# Patient Record
Sex: FEMALE | Race: WHITE
[De-identification: names, ages, dates, MRNs, and addresses within clinical notes are randomized per-mention and may not be internally consistent; named-entity substitution may affect disease eponyms.]

---

## 2019-10-26 ENCOUNTER — HOSPITAL ENCOUNTER (EMERGENCY)
Dept: HOSPITAL 97 - ER | Age: 24
Discharge: HOME | End: 2019-10-26
Payer: COMMERCIAL

## 2019-10-26 VITALS — SYSTOLIC BLOOD PRESSURE: 128 MMHG | DIASTOLIC BLOOD PRESSURE: 74 MMHG

## 2019-10-26 VITALS — TEMPERATURE: 98.2 F

## 2019-10-26 VITALS — OXYGEN SATURATION: 99 %

## 2019-10-26 DIAGNOSIS — R07.89: Primary | ICD-10-CM

## 2019-10-26 DIAGNOSIS — F17.210: ICD-10-CM

## 2019-10-26 LAB
ALBUMIN SERPL BCP-MCNC: 3.4 G/DL (ref 3.4–5)
ALP SERPL-CCNC: 59 U/L (ref 45–117)
ALT SERPL W P-5'-P-CCNC: 27 U/L (ref 12–78)
AST SERPL W P-5'-P-CCNC: 24 U/L (ref 15–37)
BUN BLD-MCNC: 13 MG/DL (ref 7–18)
GLUCOSE SERPLBLD-MCNC: 75 MG/DL (ref 74–106)
HCT VFR BLD CALC: 43.1 % (ref 36–45)
INR BLD: 0.96
LIPASE SERPL-CCNC: 97 U/L (ref 73–393)
LYMPHOCYTES # SPEC AUTO: 4 K/UL (ref 0.7–4.9)
MAGNESIUM SERPL-MCNC: 2 MG/DL (ref 1.8–2.4)
METHAMPHET UR QL SCN: NEGATIVE
NT-PROBNP SERPL-MCNC: 30 PG/ML (ref ?–125)
PMV BLD: 8.1 FL (ref 7.6–11.3)
POTASSIUM SERPL-SCNC: 3.9 MMOL/L (ref 3.5–5.1)
RBC # BLD: 4.97 M/UL (ref 3.86–4.86)
THC SERPL-MCNC: NEGATIVE NG/ML
TROPONIN (EMERG DEPT USE ONLY): < 0.02 NG/ML (ref 0–0.04)

## 2019-10-26 PROCEDURE — 71045 X-RAY EXAM CHEST 1 VIEW: CPT

## 2019-10-26 PROCEDURE — 85379 FIBRIN DEGRADATION QUANT: CPT

## 2019-10-26 PROCEDURE — 83735 ASSAY OF MAGNESIUM: CPT

## 2019-10-26 PROCEDURE — 84484 ASSAY OF TROPONIN QUANT: CPT

## 2019-10-26 PROCEDURE — 36415 COLL VENOUS BLD VENIPUNCTURE: CPT

## 2019-10-26 PROCEDURE — 96361 HYDRATE IV INFUSION ADD-ON: CPT

## 2019-10-26 PROCEDURE — 83880 ASSAY OF NATRIURETIC PEPTIDE: CPT

## 2019-10-26 PROCEDURE — 99285 EMERGENCY DEPT VISIT HI MDM: CPT

## 2019-10-26 PROCEDURE — 83690 ASSAY OF LIPASE: CPT

## 2019-10-26 PROCEDURE — 80048 BASIC METABOLIC PNL TOTAL CA: CPT

## 2019-10-26 PROCEDURE — 80076 HEPATIC FUNCTION PANEL: CPT

## 2019-10-26 PROCEDURE — 85610 PROTHROMBIN TIME: CPT

## 2019-10-26 PROCEDURE — 93005 ELECTROCARDIOGRAM TRACING: CPT

## 2019-10-26 PROCEDURE — 80307 DRUG TEST PRSMV CHEM ANLYZR: CPT

## 2019-10-26 PROCEDURE — 81025 URINE PREGNANCY TEST: CPT

## 2019-10-26 PROCEDURE — 96360 HYDRATION IV INFUSION INIT: CPT

## 2019-10-26 PROCEDURE — 81003 URINALYSIS AUTO W/O SCOPE: CPT

## 2019-10-26 PROCEDURE — 85025 COMPLETE CBC W/AUTO DIFF WBC: CPT

## 2019-10-26 NOTE — EDPHYS
Physician Documentation                                                                           

 [facility]                                                                 

Name: Dea Carlisle                                                                             

Age: 24 yrs                                                                                       

Sex: Female                                                                                       

: 1995                                                                                   

MRN: [id number]                                                                                   

Arrival Date: 10/26/2019                                                                          

Time: 19:42                                                                                       

Account#: [id number]                                                                            

Bed 18                                                                                            

Private MD:                                                                                       

ED Physician Harsha Vidal                                                                      

HPI:                                                                                              

10/26                                                                                             

20:04 This 24 yrs old  Female presents to ER via Ambulatory with complaints of Chest vi 

      Pain, Arm Pain.                                                                             

20:04 The patient or guardian reports chest pain that is located primarily in the anterior    vi 

      chest wall, left. The pain radiates to Associated signs and symptoms: The patient has       

      no apparent associated signs or symptoms. The chest pain is described as burning.           

      Duration: The patient or guardian reports a single episode, that is still ongoing.          

      Modifying factors: The symptoms are alleviated by nothing. the symptoms are aggravated      

      by nothing. Severity of pain: At its worst the pain was mild in the emergency               

      department the pain is unchanged. The patient has experienced similar episodes in the       

      past, several times.                                                                        

                                                                                                  

OB/GYN:                                                                                           

19:57 LMP 10/12/2019                                                                          bb  

                                                                                                  

Historical:                                                                                       

- Allergies:                                                                                      

19:57 No Known Allergies;                                                                     bb  

- Home Meds:                                                                                      

19:57 None [Active];                                                                          bb  

- PMHx:                                                                                           

19:57 ADD/ADHD;                                                                               bb  

- PSHx:                                                                                           

19:57 None;                                                                                   bb  

                                                                                                  

- Immunization history:: Adult Immunizations up to date.                                          

- Social history:: Smoking status: Patient uses tobacco products, smokes one-half pack            

  cigarettes per day, Patient uses alcohol, but reports only rare drinking.                       

  Patient/guardian denies using street drugs.                                                     

- Ebola Screening: : No symptoms or risks identified at this time.                                

- Family history:: not pertinent.                                                                 

                                                                                                  

                                                                                                  

ROS:                                                                                              

20:04 Constitutional: Negative for fever, chills, and weight loss, Eyes: Negative for injury, vi 

      pain, redness, and discharge, ENT: Negative for injury, pain, and discharge, Neck:          

      Negative for injury, pain, and swelling, Respiratory: Negative for shortness of breath,     

      cough, wheezing, and pleuritic chest pain, Abdomen/GI: Negative for abdominal pain,         

      nausea, vomiting, diarrhea, and constipation, Back: Negative for injury and pain, :       

      Negative for injury, bleeding, discharge, and swelling, MS/Extremity: Negative for          

      injury and deformity, Skin: Negative for injury, rash, and discoloration, Neuro:            

      Negative for headache, weakness, numbness, tingling, and seizure, Psych: Negative for       

      depression, anxiety, suicide ideation, homicidal ideation, and hallucinations,              

      Allergy/Immunology: Negative for hives, rash, and allergies, Endocrine: Negative for        

      neck swelling, polydipsia, polyuria, polyphagia, and marked weight changes,                 

      Hematologic/Lymphatic: Negative for swollen nodes, abnormal bleeding, and unusual           

      bruising.                                                                                   

20:04 Cardiovascular: Positive for chest pain, of the left clavicle and anterior aspect of        

      left upper chest.                                                                           

                                                                                                  

Exam:                                                                                             

20:04 Constitutional:  This is a well developed, well nourished patient who is awake, alert,  vi 

      and in no acute distress. Head/Face:  Normocephalic, atraumatic. Eyes:  Pupils equal        

      round and reactive to light, extra-ocular motions intact.  Lids and lashes normal.          

      Conjunctiva and sclera are non-icteric and not injected.  Cornea within normal limits.      

      Periorbital areas with no swelling, redness, or edema. ENT:  Nares patent. No nasal         

      discharge, no septal abnormalities noted.  Tympanic membranes are normal and external       

      auditory canals are clear.  Oropharynx with no redness, swelling, or masses, exudates,      

      or evidence of obstruction, uvula midline.  Mucous membranes moist. Neck:  Trachea          

      midline, no thyromegaly or masses palpated, and no cervical lymphadenopathy.  Supple,       

      full range of motion without nuchal rigidity, or vertebral point tenderness.  No            

      Meningismus. Chest/axilla:  Normal chest wall appearance and motion.  Nontender with no     

      deformity.  No lesions are appreciated. Cardiovascular:  Regular rate and rhythm with a     

      normal S1 and S2.  No gallops, murmurs, or rubs.  Normal PMI, no JVD.  No pulse             

      deficits. Respiratory:  Lungs have equal breath sounds bilaterally, clear to                

      auscultation and percussion.  No rales, rhonchi or wheezes noted.  No increased work of     

      breathing, no retractions or nasal flaring. Abdomen/GI:  Soft, non-tender, with normal      

      bowel sounds.  No distension or tympany.  No guarding or rebound.  No evidence of           

      tenderness throughout. Back:  No spinal tenderness.  No costovertebral tenderness.          

      Full range of motion. Skin:  Warm, dry with normal turgor.  Normal color with no            

      rashes, no lesions, and no evidence of cellulitis. MS/ Extremity:  Pulses equal, no         

      cyanosis.  Neurovascular intact.  Full, normal range of motion. Neuro:  Awake and           

      alert, GCS 15, oriented to person, place, time, and situation.  Cranial nerves II-XII       

      grossly intact.  Motor strength 5/5 in all extremities.  Sensory grossly intact.            

      Cerebellar exam normal.  Normal gait. Psych:  Awake, alert, with orientation to person,     

      place and time.  Behavior, mood, and affect are within normal limits.                       

20:04 Musculoskeletal/extremity: Extremities: all appear grossly normal, with no appreciated  vi 

      pain with palpation, ROM: no acute changes, intact in all extremities, full active          

      range of motion, full passive range of motion, Circulation is intact in all                 

      extremities. Sensation intact. Compartment Syndrome exam of affected extremity: is          

      normal. DVT Exam: No signs of deep vein thrombosis. no pain, no swelling, no                

      tenderness, negative Homans' sign noted on exam, no appreciated bluish discoloration,       

      no erythema, no increased warmth.                                                           

                                                                                                  

Vital Signs:                                                                                      

19:57  / 81; Pulse 100; Resp 16 S; Temp 98.2(O); Pulse Ox 100% on R/A; Weight 104.33 kg bb  

      (R); Height 5 ft. 4 in. (162.56 cm) (R); Pain 4/10;                                         

20:30  / 81; Pulse 79; Resp 18 S; Pulse Ox 100% on R/A;                                 cc3 

21:10  / 60; Pulse 78; Resp 20 S; Pulse Ox 99% on R/A;                                  cc3 

21:45  / 73 RA;                                                                         ds4 

21:53  / 72 LA;                                                                         ds4 

22:15  / 74; Pulse 76; Resp 17 S; Pulse Ox 99% on R/A;                                  cc3 

22:50  / 69; Pulse 75; Resp 19 S; Pulse Ox 99% on R/A; Pain 0/10;                       cc3 

19:57 Body Mass Index 39.48 (104.33 kg, 162.56 cm)                                            bb  

                                                                                                  

MDM:                                                                                              

19:55 Patient medically screened.                                                             [facility] 

20:07 Data reviewed: vital signs, nurses notes, lab test result(s), EKG, radiologic studies,  vi 

      ultrasound.                                                                                 

                                                                                                  

10/26                                                                                             

20:03 Order name: Basic Metabolic Panel; Complete Time: 21:13                                 [facility] 

10/26                                                                                             

20:03 Order name: CBC with Diff; Complete Time: 20:53                                         [facility] 

10/26                                                                                             

20:03 Order name: LFT's; Complete Time: 21:13                                                 [facility] 

10/26                                                                                             

20:03 Order name: Magnesium; Complete Time: 21:13                                             [facility] 

10/26                                                                                             

20:03 Order name: NT PRO-BNP; Complete Time: 21:13                                            [facility] 

10/26                                                                                             

20:03 Order name: PT-INR; Complete Time: 20:49                                                [facility] 

10/26                                                                                             

20:03 Order name: Troponin (emerg Dept Use Only); Complete Time: 21:13                        [facility] 

10/26                                                                                             

20:03 Order name: Lipase; Complete Time: 21:13                                                [facility] 

10/26                                                                                             

20:03 Order name: D-Dimer; Complete Time: 20:49                                               [facility] 

10/26                                                                                             

20:53 Order name: UDS                                                                         [facility] 

10/26                                                                                             

21:14 Order name: Troponin (emerg Dept Use Only)                                              [facility] 

10/26                                                                                             

21:14 Order name: Troponin (Emerg Dept Use Only); Complete Time: 22:21                        [facility]

10/26                                                                                             

22:43 Order name: Urine Dipstick--Ancillary (enter results)                                   [facility] 

10/26                                                                                             

22:43 Order name: Urine Pregnancy--Ancillary (enter results)                                  [facility] 

10/26                                                                                             

20:03 Order name: XRAY Chest (1 view); Complete Time: 21:13                                   [facility] 

10/26                                                                                             

20:03 Order name: EKG; Complete Time: 20:04                                                   [facility] 

10/26                                                                                             

20:03 Order name: Cardiac monitoring; Complete Time: 20:04                                    [facility] 

10/26                                                                                             

20:03 Order name: EKG - Nurse/Tech; Complete Time: 20:04                                      [facility] 

10/26                                                                                             

20:03 Order name: IV Saline Lock; Complete Time: 20:25                                        [facility] 

10/26                                                                                             

20:03 Order name: Labs collected and sent; Complete Time: 20:25                               [facility] 

10/26                                                                                             

20:03 Order name: O2 Per Protocol; Complete Time: 20:04                                       [facility] 

10/26                                                                                             

20:03 Order name: O2 Sat Monitoring; Complete Time: 20:04                                     [facility] 

10/26                                                                                             

20:03 Order name: Urine Dipstick-Ancillary (obtain specimen); Complete Time: 22:39            [facility] 

10/26                                                                                             

20:03 Order name: Urine Pregnancy Test (obtain specimen); Complete Time: 22:39                [facility] 

10/26                                                                                             

21:14 Order name: Bilateral blood pressure; Complete Time: 21:45                              [facility] 

                                                                                                  

Administered Medications:                                                                         

20:13 Drug: Aspirin 162 mg Route: PO;                                                         ca1 

20:25 Follow up: Response: No adverse reaction                                                ca1 

20:30 Drug: NS 0.9% 1000 ml Route: IV; Rate: 125 ml/hr; Site: left antecubital;               cc3 

23:00 Follow up: Response: No adverse reaction; IV Status: Order to discontinue infusion; IV  cc3 

      Intake: 250ml ; patient discharged home                                                     

                                                                                                  

                                                                                                  

Disposition:                                                                                      

10/26/19 21:14 Discharged to Home. Impression: Other chest pain.                                  

- Condition is Stable.                                                                            

- Discharge Instructions: Nonspecific Chest Pain, Chest Wall Pain, Nonspecific Chest              

  Pain, Easy-to-Read, Aspirin and Your Heart.                                                     

                                                                                                  

- Medication Reconciliation Form, Thank You Letter, Antibiotic Education, Prescription            

  Opioid Use form.                                                                                

- Follow up: Private Physician; When: 2 - 3 days; Reason: Recheck today's complaints,             

  Continuance of care, Re-evaluation by your physician. Follow up: Anthony Hagan;                 

  Reason: Recheck today's complaints, Re-evaluation by your physician.                            

- Problem is new.                                                                                 

- Symptoms have improved.                                                                         

                                                                                                  

                                                                                                  

                                                                                                  

Signatures:                                                                                       

Dispatcher [employer]                           EDMS                                                 

Harsha Vidal MD MD cha Ballard, Brenda, RN                     RN                                                      

Deisi Robert                             3                                                  

Ac, JESS Arteaga                        RN   ca1                                                  

                                                                                                  

Corrections: (The following items were deleted from the chart)                                    

23:10 21:14 10/26/2019 21:14 Discharged to Home. Impression: Other chest pain. Condition is   cc3 

      Stable. Discharge Instructions: Nonspecific Chest Pain, Chest Wall Pain, Nonspecific        

      Chest Pain, Easy-to-Read, Aspirin and Your Heart. Forms are Medication Reconciliation       

      Form, Thank You Letter, Antibiotic Education, Prescription Opioid Use. Follow up:           

      Private Physician; When: 2 - 3 days; Reason: Recheck today's complaints, Continuance of     

      care, Re-evaluation by your physician. Follow up: Anthony Hagan; Reason: Recheck            

      today's complaints, Re-evaluation by your physician. Problem is new. Symptoms have          

      improved. vi                                                                               

                                                                                                  

**************************************************************************************************

## 2019-10-26 NOTE — RAD REPORT
EXAM DESCRIPTION:  Fady Single View10/26/2019 8:46 pm

 

CLINICAL HISTORY:  Chest pain

 

COMPARISON:  none

 

FINDINGS:   The lungs appear clear of acute infiltrate. The heart is normal size

 

IMPRESSION:   No acute abnormalities displayed

## 2019-10-27 NOTE — EKG
Test Date:    2019-10-26               Test Time:    19:46:03

Technician:   TORY                                    

                                                     

MEASUREMENT RESULTS:                                       

Intervals:                                           

Rate:         94                                     

MN:           148                                    

QRSD:         94                                     

QT:           380                                    

QTc:          475                                    

Axis:                                                

P:            50                                     

MN:           148                                    

QRS:          55                                     

T:            66                                     

                                                     

INTERPRETIVE STATEMENTS:                                       

                                                     

Normal sinus rhythm

Normal ECG

No previous ECG available for comparison



Electronically Signed On 10-27-19 19:52:51 CDT by Anthony Hagan

## 2021-12-07 NOTE — ER
Check labs   Nurse's Notes                                                                                     

 [facility]                                                                 

Name: Dea Carlisle                                                                             

Age: 24 yrs                                                                                       

Sex: Female                                                                                       

: 1995                                                                                   

MRN: [id number]                                                                                   

Arrival Date: 10/26/2019                                                                          

Time: 19:42                                                                                       

Account#: [id number]                                                                            

Bed 18                                                                                            

Private MD:                                                                                       

Diagnosis: Other chest pain                                                                       

                                                                                                  

Presentation:                                                                                     

10/26                                                                                             

19:53 Presenting complaint: Patient states: she is having numbness and tingling in her left   bb  

      arm x 1 week and yesterday she started having intermittent chest pain and was nauseous      

      but now the pain is 4/10 and she is not nauseous. Transition of care: patient was not       

      received from another setting of care. Onset of symptoms was 2019. Risk         

      Assessment: Do you want to hurt yourself or someone else? Patient reports no desire to      

      harm self or others. Initial Sepsis Screen: Does the patient meet any 2 criteria? No.       

      Patient's initial sepsis screen is negative. Does the patient have a suspected source       

      of infection? No. Patient's initial sepsis screen is negative. Care prior to arrival:       

      None.                                                                                       

19:53 Method Of Arrival: Ambulatory                                                             

19:53 Acuity: NIKKI 3                                                                           bb  

                                                                                                  

OB/GYN:                                                                                           

19:57 LMP 10/12/2019                                                                          bb  

                                                                                                  

Historical:                                                                                       

- Allergies:                                                                                      

19:57 No Known Allergies;                                                                     bb  

- Home Meds:                                                                                      

19:57 None [Active];                                                                          bb  

- PMHx:                                                                                           

19:57 ADD/ADHD;                                                                               bb  

- PSHx:                                                                                           

19:57 None;                                                                                   bb  

                                                                                                  

- Immunization history:: Adult Immunizations up to date.                                          

- Social history:: Smoking status: Patient uses tobacco products, smokes one-half pack            

  cigarettes per day, Patient uses alcohol, but reports only rare drinking.                       

  Patient/guardian denies using street drugs.                                                     

- Ebola Screening: : No symptoms or risks identified at this time.                                

- Family history:: not pertinent.                                                                 

                                                                                                  

                                                                                                  

Screenin:58 Abuse screen: Denies threats or abuse. Denies injuries from another. Nutritional        ca1 

      screening: No deficits noted. Tuberculosis screening: No symptoms or risk factors           

      identified. Fall Risk None identified.                                                      

                                                                                                  

Assessment:                                                                                       

19:58 General: Appears in no apparent distress. comfortable, Behavior is calm, cooperative,   ca1 

      appropriate for age. Pain: Complains of pain in chest Pain does not radiate. Pain           

      currently is 4 out of 10 on a pain scale. Pain began 1 day ago. Is intermittent. Neuro:     

      Level of Consciousness is awake, alert, obeys commands, Oriented to person, place,          

      time, situation. Neuro: Reports numbness in left arm since 3 weeks ago. Cardiovascular:     

      Heart tones S1 S2 present Capillary refill < 3 seconds Patient's skin is warm and dry.      

      Rhythm is sinus rhythm. Respiratory: Airway is patent Respiratory effort is even,           

      unlabored, Respiratory pattern is regular, symmetrical, Breath sounds are clear             

      bilaterally. GI: Abdomen is round non-distended, Bowel sounds present X 4 quads. Abd is     

      soft and non tender X 4 quads. : No deficits noted. No signs and/or symptoms were         

      reported regarding the genitourinary system. EENT: No deficits noted. No signs and/or       

      symptoms were reported regarding the EENT system. Derm: Skin is intact, is healthy with     

      good turgor, Skin is pink, warm \T\ dry. Musculoskeletal: Circulation, motion, and          

      sensation intact. Capillary refill < 3 seconds, Range of motion: intact in all              

      extremities.                                                                                

20:30 Reassessment: Patient appears in no apparent distress at this time. Patient and/or      cc3 

      family updated on plan of care and expected duration. Pain level reassessed. Patient is     

      alert, oriented x 3, equal unlabored respirations, skin warm/dry/pink.                      

21:18 Reassessment: Patient appears in no apparent distress at this time. Patient and/or      cc3 

      family updated on plan of care and expected duration. Pain level reassessed. Patient is     

      alert, oriented x 3, equal unlabored respirations, skin warm/dry/pink. Dr. Vidal         

      ordered patient for discharge home but after urine exam.                                    

22:12 Reassessment: Patient appears in no apparent distress at this time. Patient and/or      cc3 

      family updated on plan of care and expected duration. Pain level reassessed. Patient is     

      alert, oriented x 3, equal unlabored respirations, skin warm/dry/pink.                      

23:00 Reassessment: Patient appears in no apparent distress at this time. Patient and/or      cc3 

      family updated on plan of care and expected duration. Pain level reassessed. Patient is     

      alert, oriented x 3, equal unlabored respirations, skin warm/dry/pink. Patient              

      discharged home, no prescription given. IV cannula removed and patient left ER vitally      

      stable and ambulatory with her friend. No valuables left in the patient's room. Patient     

      denies pain at this time. Patient states feeling better. Patient states symptoms have       

      improved.                                                                                   

                                                                                                  

Vital Signs:                                                                                      

19:57  / 81; Pulse 100; Resp 16 S; Temp 98.2(O); Pulse Ox 100% on R/A; Weight 104.33 kg bb  

      (R); Height 5 ft. 4 in. (162.56 cm) (R); Pain 4/10;                                         

20:30  / 81; Pulse 79; Resp 18 S; Pulse Ox 100% on R/A;                                 cc3 

21:10  / 60; Pulse 78; Resp 20 S; Pulse Ox 99% on R/A;                                  cc3 

21:45  / 73 RA;                                                                         ds4 

21:53  / 72 LA;                                                                         ds4 

22:15  / 74; Pulse 76; Resp 17 S; Pulse Ox 99% on R/A;                                  cc3 

22:50  / 69; Pulse 75; Resp 19 S; Pulse Ox 99% on R/A; Pain 0/10;                       cc3 

19:57 Body Mass Index 39.48 (104.33 kg, 162.56 cm)                                            bb  

                                                                                                  

ED Course:                                                                                        

19:42 Patient arrived in ED.                                                                  es  

19:55 Harsha Vidal MD is Attending Physician.                                             vi 

19:55 Jenni Aguilar, RN is Primary Nurse.                                                      ca1 

19:56 Triage completed.                                                                       bb  

19:57 Arm band placed on Patient placed in an exam room, on a stretcher, on cardiac monitor,  bb  

      on pulse oximetry. EKG completed in triage. Results shown to MD. Family accompanied         

      patient.                                                                                    

19:58 Patient has correct armband on for positive identification. Placed in gown. Bed in low  ca1 

      position. Call light in reach. Side rails up X 1. Cardiac monitor on. Pulse ox on. NIBP     

      on. Warm blanket given.                                                                     

19:58 No provider procedures requiring assistance completed. Patient maintains SpO2           ca1 

      saturation greater than 95% on room air.                                                    

20:20 Inserted saline lock: 20 gauge in left antecubital area, using aseptic technique. Blood ds4 

      collected.                                                                                  

20:28 Troponin (emerg Dept Use Only) Sent.                                                    ds4 

20:28 PT-INR Sent.                                                                            ds4 

20:28 Magnesium Sent.                                                                         ds4 

20:28 LFT's Sent.                                                                             ds4 

20:29 CBC with Diff Sent.                                                                     ds4 

20:29 Basic Metabolic Panel Sent.                                                             ds4 

20:29 NT PRO-BNP Sent.                                                                        ds4 

20:29 Lipase Sent.                                                                            ds4 

20:29 D-Dimer Sent.                                                                           ds4 

20:46 XRAY Chest (1 view) In Process Unspecified.                                             EDMS

21:14 Anthnoy Hagan MD is Referral Physician.                                               vi 

21:43 Troponin (emerg Dept Use Only) Sent.                                                    ds4 

21:43 Troponin (Emerg Dept Use Only) Sent.                                                    ds4 

23:00 IV discontinued, intact, bleeding controlled, No redness/swelling at site. Pressure     cc3 

      dressing applied.                                                                           

                                                                                                  

Administered Medications:                                                                         

20:13 Drug: Aspirin 162 mg Route: PO;                                                         ca1 

20:25 Follow up: Response: No adverse reaction                                                ca1 

20:30 Drug: NS 0.9% 1000 ml Route: IV; Rate: 125 ml/hr; Site: left antecubital;               cc3 

23:00 Follow up: Response: No adverse reaction; IV Status: Order to discontinue infusion; IV  cc3 

      Intake: 250ml ; patient discharged home                                                     

                                                                                                  

                                                                                                  

Intake:                                                                                           

23:00 IV: 250ml; Total: 250ml.                                                                cc3 

                                                                                                  

Outcome:                                                                                          

21:14 Discharge ordered by MD.                                                                [facility] 

23:00 Discharged to home ambulatory, with friend.                                             cc3 

23:00 Condition: stable                                                                           

23:00 Discharge instructions given to patient, Instructed on discharge instructions, follow       

      up and referral plans. Demonstrated understanding of instructions, follow-up care.          

23:10 Patient left the ED.                                                                    cc3 

                                                                                                  

Signatures:                                                                                       

Dispatcher [employer]                           EDMS                                                 

Harsha Vidal MD MD cha Salyer, Martha Myles, JESS                     RN   Joshua Jerome                             ds4                                                  

Deisi Robert                             cc3                                                  

Jenni Aguilar RN                        RN   ca1                                                  

                                                                                                  

Corrections: (The following items were deleted from the chart)                                    

23:53 21:18 Reassessment: Patient appears in no apparent distress at this time. Patient       cc3 

      and/or family updated on plan of care and expected duration. Pain level reassessed.         

      Patient is alert, oriented x 3, equal unlabored respirations, skin warm/dry/pink. cc3       

                                                                                                  

**************************************************************************************************